# Patient Record
Sex: FEMALE | Race: OTHER
[De-identification: names, ages, dates, MRNs, and addresses within clinical notes are randomized per-mention and may not be internally consistent; named-entity substitution may affect disease eponyms.]

---

## 2017-06-05 ENCOUNTER — HOSPITAL ENCOUNTER (OUTPATIENT)
Dept: HOSPITAL 53 - M SFHCCLAY | Age: 42
End: 2017-06-05
Attending: FAMILY MEDICINE
Payer: COMMERCIAL

## 2017-06-05 DIAGNOSIS — Z00.00: Primary | ICD-10-CM

## 2017-06-05 LAB
ALBUMIN SERPL BCG-MCNC: 3.5 GM/DL (ref 3.2–5.2)
ALBUMIN/GLOB SERPL: 1.09 {RATIO} (ref 1–1.93)
ALP SERPL-CCNC: 67 U/L (ref 45–117)
ALT SERPL W P-5'-P-CCNC: 24 U/L (ref 12–78)
ANION GAP SERPL CALC-SCNC: 8 MEQ/L (ref 8–16)
AST SERPL-CCNC: 19 U/L (ref 15–37)
BILIRUB SERPL-MCNC: 0.3 MG/DL (ref 0.2–1)
BUN SERPL-MCNC: 20 MG/DL (ref 7–18)
CALCIUM SERPL-MCNC: 8.3 MG/DL (ref 8.5–10.1)
CHLORIDE SERPL-SCNC: 106 MEQ/L (ref 98–107)
CHOLEST SERPL-MCNC: 163 MG/DL (ref ?–200)
CO2 SERPL-SCNC: 25 MEQ/L (ref 21–32)
CREAT SERPL-MCNC: 0.76 MG/DL (ref 0.55–1.02)
GFR SERPL CREATININE-BSD FRML MDRD: > 60 ML/MIN/{1.73_M2} (ref 58–?)
GLUCOSE SERPL-MCNC: 90 MG/DL (ref 70–105)
POTASSIUM SERPL-SCNC: 4.1 MEQ/L (ref 3.5–5.1)
PROT SERPL-MCNC: 6.7 GM/DL (ref 6.4–8.2)
SODIUM SERPL-SCNC: 139 MEQ/L (ref 136–145)
TRIGL SERPL-MCNC: 41 MG/DL (ref ?–150)

## 2017-10-15 ENCOUNTER — HOSPITAL ENCOUNTER (OUTPATIENT)
Dept: HOSPITAL 53 - M WUC | Age: 42
End: 2017-10-15
Attending: PHYSICIAN ASSISTANT
Payer: COMMERCIAL

## 2017-10-15 DIAGNOSIS — M23.8X2: Primary | ICD-10-CM

## 2017-10-16 NOTE — REP
Left knee series:  Five views.

 

History:  Knee injury.

 

Findings:  Five views of the left knee demonstrate normal bones, joints, and soft

tissues.  No fracture or subluxation is seen.

 

Impression:

 

Negative left knee radiographs.

 

 

Signed by

Greg Camarillo MD 10/16/2017 08:44 A

## 2017-10-21 ENCOUNTER — HOSPITAL ENCOUNTER (OUTPATIENT)
Dept: HOSPITAL 53 - M RAD | Age: 42
End: 2017-10-21
Attending: FAMILY MEDICINE
Payer: COMMERCIAL
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
## 2017-10-21 DIAGNOSIS — S89.92XD: Primary | ICD-10-CM
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
## 2017-10-23 NOTE — REP
MRI LEFT KNEE:

 

TECHNIQUE:  Axial proton density fat saturation, sagittal proton density T2 STIR,

water excitation, coronal proton density, proton density fat saturation.

 

The study is limited due to patient motion.

 

I do not see definite meniscal tear.  There is increased signal on T2-weighted

images involving the superior aspect of the anterior cruciate ligament at its

femoral insertion.  The ligament appears torn at its femoral insertion site.  The

posterior cruciate ligament is intact.  Collateral ligaments appear intact.

Extensor mechanism is intact.  No cartilaginous defect is seen.  There is

ill-defined marrow edema in the tibial plateaus posteriorly compatible with bone

bruising.  There is a moderate joint effusion.  There is mild to moderate

superficial soft tissue edema.

 

IMPRESSION:

 

The study is limited due to patient motion. I do not see a definite meniscal

tear.  There does appear to be a tear of the anterior cruciate ligament at its

femoral insertion.  There is bone bruising of the posterior aspect of the tibial

plateaus.  There is a moderate joint effusion.

 

 

Signed by

Xavi Terrazas MD 10/23/2017 09:57 A

## 2017-11-27 ENCOUNTER — HOSPITAL ENCOUNTER (OUTPATIENT)
Dept: HOSPITAL 53 - M SFHCCLAY | Age: 42
End: 2017-11-27
Attending: FAMILY MEDICINE
Payer: COMMERCIAL

## 2017-11-27 ENCOUNTER — HOSPITAL ENCOUNTER (OUTPATIENT)
Dept: HOSPITAL 53 - M PT | Age: 42
LOS: 3 days | End: 2017-11-30
Attending: ORTHOPAEDIC SURGERY
Payer: COMMERCIAL

## 2017-11-27 DIAGNOSIS — M25.569: ICD-10-CM

## 2017-11-27 DIAGNOSIS — Z01.818: Primary | ICD-10-CM

## 2017-11-27 DIAGNOSIS — Z51.89: Primary | ICD-10-CM

## 2017-11-27 LAB
ANION GAP SERPL CALC-SCNC: 8 MEQ/L (ref 8–16)
BUN SERPL-MCNC: 17 MG/DL (ref 7–18)
CALCIUM SERPL-MCNC: 9.3 MG/DL (ref 8.5–10.1)
CHLORIDE SERPL-SCNC: 106 MEQ/L (ref 98–107)
CO2 SERPL-SCNC: 25 MEQ/L (ref 21–32)
CREAT SERPL-MCNC: 0.71 MG/DL (ref 0.55–1.02)
ERYTHROCYTE [DISTWIDTH] IN BLOOD BY AUTOMATED COUNT: 13 % (ref 11.5–14.5)
GFR SERPL CREATININE-BSD FRML MDRD: > 60 ML/MIN/{1.73_M2} (ref 58–?)
GLUCOSE SERPL-MCNC: 96 MG/DL (ref 70–105)
INR PPP: 0.88
MCH RBC QN AUTO: 28.3 PG (ref 27–33)
MCHC RBC AUTO-ENTMCNC: 32.7 G/DL (ref 32–36.5)
MCV RBC AUTO: 86.3 FL (ref 80–96)
NRBC BLD AUTO-RTO: 0 % (ref 0–0)
PLATELET # BLD AUTO: 263 10^3/UL (ref 150–450)
POTASSIUM SERPL-SCNC: 4.3 MEQ/L (ref 3.5–5.1)
SODIUM SERPL-SCNC: 139 MEQ/L (ref 136–145)
WBC # BLD AUTO: 6.2 10^3/UL (ref 4–10)

## 2017-12-13 ENCOUNTER — HOSPITAL ENCOUNTER (OUTPATIENT)
Dept: HOSPITAL 53 - M PT | Age: 42
LOS: 18 days | End: 2017-12-31
Attending: ORTHOPAEDIC SURGERY
Payer: COMMERCIAL

## 2017-12-13 DIAGNOSIS — S83.282A: ICD-10-CM

## 2017-12-13 DIAGNOSIS — Z51.89: Primary | ICD-10-CM

## 2017-12-13 DIAGNOSIS — M25.569: ICD-10-CM

## 2017-12-13 PROCEDURE — 97110 THERAPEUTIC EXERCISES: CPT

## 2018-01-02 ENCOUNTER — HOSPITAL ENCOUNTER (OUTPATIENT)
Dept: HOSPITAL 53 - M PT | Age: 43
LOS: 29 days | End: 2018-01-31
Attending: ORTHOPAEDIC SURGERY
Payer: COMMERCIAL

## 2018-01-02 DIAGNOSIS — M25.569: Primary | ICD-10-CM

## 2018-01-02 PROCEDURE — 97010 HOT OR COLD PACKS THERAPY: CPT

## 2018-02-07 ENCOUNTER — HOSPITAL ENCOUNTER (OUTPATIENT)
Dept: HOSPITAL 53 - M PT | Age: 43
LOS: 21 days | End: 2018-02-28
Attending: ORTHOPAEDIC SURGERY
Payer: COMMERCIAL

## 2018-02-07 DIAGNOSIS — Z51.89: Primary | ICD-10-CM

## 2018-02-07 DIAGNOSIS — M25.569: ICD-10-CM

## 2018-02-07 PROCEDURE — 97110 THERAPEUTIC EXERCISES: CPT

## 2018-03-07 ENCOUNTER — HOSPITAL ENCOUNTER (OUTPATIENT)
Dept: HOSPITAL 53 - M PT | Age: 43
LOS: 24 days | End: 2018-03-31
Attending: ORTHOPAEDIC SURGERY
Payer: COMMERCIAL

## 2018-03-07 DIAGNOSIS — M25.569: ICD-10-CM

## 2018-03-07 DIAGNOSIS — Z51.89: Primary | ICD-10-CM

## 2018-03-07 PROCEDURE — 97110 THERAPEUTIC EXERCISES: CPT

## 2018-04-12 ENCOUNTER — HOSPITAL ENCOUNTER (OUTPATIENT)
Dept: HOSPITAL 53 - M PT | Age: 43
LOS: 18 days | End: 2018-04-30
Attending: ORTHOPAEDIC SURGERY
Payer: COMMERCIAL

## 2018-04-12 DIAGNOSIS — M25.562: Primary | ICD-10-CM

## 2018-04-12 PROCEDURE — 97110 THERAPEUTIC EXERCISES: CPT

## 2018-06-23 ENCOUNTER — HOSPITAL ENCOUNTER (OUTPATIENT)
Dept: HOSPITAL 53 - M LAB REF | Age: 43
End: 2018-06-23
Payer: COMMERCIAL

## 2018-06-23 DIAGNOSIS — J02.9: Primary | ICD-10-CM

## 2018-06-23 PROCEDURE — 87070 CULTURE OTHR SPECIMN AEROBIC: CPT

## 2018-07-11 ENCOUNTER — HOSPITAL ENCOUNTER (OUTPATIENT)
Dept: HOSPITAL 53 - M SFHCCLAY | Age: 43
End: 2018-07-11
Attending: FAMILY MEDICINE
Payer: COMMERCIAL

## 2018-07-11 DIAGNOSIS — Z00.00: Primary | ICD-10-CM

## 2018-07-11 LAB
ALBUMIN/GLOBULIN RATIO: 1.09 (ref 1–1.93)
ALBUMIN: 3.8 GM/DL (ref 3.2–5.2)
ALKALINE PHOSPHATASE: 82 U/L (ref 45–117)
ALT SERPL W P-5'-P-CCNC: 28 U/L (ref 12–78)
ANION GAP: 6 MEQ/L (ref 8–16)
AST SERPL-CCNC: 18 U/L (ref 7–37)
BILIRUBIN,TOTAL: 0.5 MG/DL (ref 0.2–1)
BLOOD UREA NITROGEN: 17 MG/DL (ref 7–18)
CALCIUM LEVEL: 8.7 MG/DL (ref 8.5–10.1)
CARBON DIOXIDE LEVEL: 27 MEQ/L (ref 21–32)
CHLORIDE LEVEL: 107 MEQ/L (ref 98–107)
CHOLEST SERPL-MCNC: 199 MG/DL (ref ?–200)
CHOLESTEROL RISK RATIO: 3.11 (ref ?–5)
CREATININE FOR GFR: 0.79 MG/DL (ref 0.55–1.3)
GFR SERPL CREATININE-BSD FRML MDRD: > 60 ML/MIN/{1.73_M2} (ref 58–?)
GLUCOSE, FASTING: 102 MG/DL (ref 70–100)
HDLC SERPL-MCNC: 64 MG/DL (ref 40–?)
LDL CHOLESTEROL: 121.6 MG/DL (ref ?–100)
NONHDLC SERPL-MCNC: 135 MG/DL
POTASSIUM SERUM: 4.6 MEQ/L (ref 3.5–5.1)
SODIUM LEVEL: 140 MEQ/L (ref 136–145)
THYROID STIMULATING HORMONE: 1.77 UIU/ML (ref 0.36–3.74)
TOTAL PROTEIN: 7.3 GM/DL (ref 6.4–8.2)
TRIGLYCERIDES LEVEL: 67 MG/DL (ref ?–150)

## 2018-07-11 PROCEDURE — 84443 ASSAY THYROID STIM HORMONE: CPT

## 2018-10-27 ENCOUNTER — HOSPITAL ENCOUNTER (OUTPATIENT)
Dept: HOSPITAL 53 - M WUC | Age: 43
End: 2018-10-27
Attending: PHYSICIAN ASSISTANT
Payer: COMMERCIAL

## 2018-10-27 DIAGNOSIS — M79.644: Primary | ICD-10-CM

## 2018-10-27 LAB — URIC ACID: 2.6 MG/DL (ref 2.6–6)

## 2018-10-27 PROCEDURE — 84550 ASSAY OF BLOOD/URIC ACID: CPT

## 2018-10-28 ENCOUNTER — HOSPITAL ENCOUNTER (EMERGENCY)
Dept: HOSPITAL 53 - M ED | Age: 43
Discharge: HOME | End: 2018-10-28
Payer: COMMERCIAL

## 2018-10-28 DIAGNOSIS — L03.113: Primary | ICD-10-CM

## 2018-10-28 PROCEDURE — 73130 X-RAY EXAM OF HAND: CPT

## 2018-10-28 RX ADMIN — HYDROCODONE BITARTRATE AND ACETAMINOPHEN 1 TAB: 5; 325 TABLET ORAL at 20:29

## 2018-10-28 RX ADMIN — CLINDAMYCIN HYDROCHLORIDE 1 MG: 150 CAPSULE ORAL at 20:28

## 2018-10-29 ENCOUNTER — HOSPITAL ENCOUNTER (OUTPATIENT)
Dept: HOSPITAL 53 - M SFHCCLAY | Age: 43
End: 2018-10-29
Attending: FAMILY MEDICINE
Payer: COMMERCIAL

## 2018-10-29 DIAGNOSIS — Z12.4: Primary | ICD-10-CM

## 2018-10-31 LAB — HPV LOW VOL RFLX: (no result)

## 2020-04-17 ENCOUNTER — HOSPITAL ENCOUNTER (OUTPATIENT)
Dept: HOSPITAL 53 - M WUC | Age: 45
End: 2020-04-17
Attending: NURSE PRACTITIONER
Payer: COMMERCIAL

## 2020-04-17 DIAGNOSIS — M25.521: Primary | ICD-10-CM

## 2020-04-17 NOTE — REP
HISTORY: Elbow pain. No trauma.

 

COMPARISON: No priors.

 

FINDINGS:

 

There is no acute fracture, dislocation, subluxation, or joint effusion.

 

 

 

 

Electronically Signed by

Luke Smith DO 04/17/2020 07:59 P

## 2021-02-03 ENCOUNTER — HOSPITAL ENCOUNTER (OUTPATIENT)
Dept: HOSPITAL 53 - M PLALAB | Age: 46
End: 2021-02-03
Attending: ADVANCED PRACTICE MIDWIFE
Payer: COMMERCIAL

## 2021-02-03 DIAGNOSIS — R10.2: Primary | ICD-10-CM

## 2021-02-03 LAB
B-HCG SERPL QL: NEGATIVE
CHLAMYDIA DNA AMPLIFICATION: NEGATIVE
FSH SERPL-ACNC: 4.3 MIU/ML
LH SERPL-ACNC: 4.6 MIU/ML
N GONORRHOEA RRNA SPEC QL NAA+PROBE: NEGATIVE
T4 FREE SERPL-MCNC: 0.86 NG/DL (ref 0.76–1.46)
TSH SERPL DL<=0.005 MIU/L-ACNC: 1.78 UIU/ML (ref 0.36–3.74)

## 2021-02-05 ENCOUNTER — HOSPITAL ENCOUNTER (OUTPATIENT)
Dept: HOSPITAL 53 - M WHC | Age: 46
End: 2021-02-05
Attending: ADVANCED PRACTICE MIDWIFE
Payer: COMMERCIAL

## 2021-02-05 DIAGNOSIS — N88.8: Primary | ICD-10-CM

## 2021-02-05 DIAGNOSIS — R10.2: ICD-10-CM

## 2021-02-05 NOTE — REP
INDICATION:

R10.2 PELVIC PAIN,MENORRHAGIA



COMPARISON:

None.



TECHNIQUE:

Transabdominal pelvic ultrasound followed by transvaginal examination for better

evaluation of the endometrium and adnexa with color evaluation of the ovaries.



FINDINGS:

Bladder is unremarkable and measures .



Normal anteverted uterus measures 8.9 x 5.1 x 5.5 cm.  The endometrial complex

measures 15.5 mm thickness without discrete abnormality. Nabothian cysts identified

measuring up to 15 mm.



Bilateral ovaries are normal in appearance and vascularity without evidence for

torsion.  Right ovary measures 4.0 x 1.7 x 1.5 cm; left ovary measures 3.6 x 2.1 x 2.6

cm.



IMPRESSION:

Prominent nabothian cysts.

No further pelvic abnormality appreciated.

Thickened endometrial complex likely related to menstrual cycle.





<Electronically signed by Kyree Ramos > 02/05/21 [id number]

## 2021-04-05 ENCOUNTER — HOSPITAL ENCOUNTER (OUTPATIENT)
Dept: HOSPITAL 53 - M SFHCCLAY | Age: 46
End: 2021-04-05
Attending: FAMILY MEDICINE
Payer: COMMERCIAL

## 2021-04-05 DIAGNOSIS — M79.661: ICD-10-CM

## 2021-04-05 DIAGNOSIS — M79.662: Primary | ICD-10-CM

## 2021-04-05 LAB
BUN SERPL-MCNC: 16 MG/DL (ref 7–18)
CALCIUM SERPL-MCNC: 9.1 MG/DL (ref 8.5–10.1)
CHLORIDE SERPL-SCNC: 107 MEQ/L (ref 98–107)
CO2 SERPL-SCNC: 24 MEQ/L (ref 21–32)
CREAT SERPL-MCNC: 0.73 MG/DL (ref 0.55–1.3)
GFR SERPL CREATININE-BSD FRML MDRD: > 60 ML/MIN/{1.73_M2} (ref 58–?)
GLUCOSE SERPL-MCNC: 114 MG/DL (ref 70–100)
HCT VFR BLD AUTO: 40.8 % (ref 36–47)
HGB BLD-MCNC: 13.1 G/DL (ref 12–15.5)
MAGNESIUM SERPL-MCNC: 2.1 MG/DL (ref 1.8–2.4)
MCH RBC QN AUTO: 28.2 PG (ref 27–33)
MCHC RBC AUTO-ENTMCNC: 32.1 G/DL (ref 32–36.5)
MCV RBC AUTO: 87.7 FL (ref 80–96)
PLATELET # BLD AUTO: 289 10^3/UL (ref 150–450)
POTASSIUM SERPL-SCNC: 4.4 MEQ/L (ref 3.5–5.1)
RBC # BLD AUTO: 4.65 10^6/UL (ref 4–5.4)
SODIUM SERPL-SCNC: 140 MEQ/L (ref 136–145)
WBC # BLD AUTO: 7 10^3/UL (ref 4–10)

## 2021-05-03 ENCOUNTER — HOSPITAL ENCOUNTER (OUTPATIENT)
Dept: HOSPITAL 53 - M SFHCCLAY | Age: 46
End: 2021-05-03
Attending: FAMILY MEDICINE
Payer: COMMERCIAL

## 2021-05-03 DIAGNOSIS — R73.01: Primary | ICD-10-CM

## 2021-05-03 LAB — EST. AVERAGE GLUCOSE BLD GHB EST-MCNC: 108 MG/DL (ref 60–110)

## 2021-05-13 ENCOUNTER — HOSPITAL ENCOUNTER (OUTPATIENT)
Dept: HOSPITAL 53 - M RAD | Age: 46
End: 2021-05-13
Attending: FAMILY MEDICINE
Payer: COMMERCIAL

## 2021-05-13 DIAGNOSIS — I70.293: Primary | ICD-10-CM

## 2021-05-16 NOTE — REP
INDICATION:

PATRICIA LOWER LEG PAIN



COMPARISON:

None.



TECHNIQUE:

Real time gray scale and color Doppler evaluation of the bilateral lower extremity

arterial vasculature using linear high frequency transducer.



FINDINGS:

Gray scale and color images demonstrate minimal amounts of atheromatous plaquing with

no focal stenosis or occlusion identified.  Doppler interrogation demonstrates normal

triphasic arterial wave forms and velocities bilaterally.



Right WALLY: 1.1

Left WALLY: 1.1



Peak systolic velocities (cm/sec)



Common femoral artery:  Right 158.2; Left 130.6

Profunda femoris:  Right 73.0; Left 45.5

SFA (proximal):  Right 91.6; Left 86.4

SFA (mid):  Right 101.1; Left 86.7

SFA (distal):  Right 62.9; Left 79.4

Popliteal artery:  Right 51.6; Left this 53.6

ESTELLA (prox.):  Right 79.1; Left 58.2

Tibioperoneal trunk:  Right 51.0; Left 89.1

PTA (prox.):  Right 55.3; Left 59.7

PTA (distal):  Right 67.4; Left 57.7

ESTELLA (distal):  Right 84.8; Left 81.6



IMPRESSION:

Minimal bilateral atheromatous plaquing with no focal occlusion or stenosis.





<Electronically signed by Kyree Ramos > 05/16/21 [id number]

## 2021-09-13 ENCOUNTER — HOSPITAL ENCOUNTER (OUTPATIENT)
Dept: HOSPITAL 53 - M SFHCCLAY | Age: 46
End: 2021-09-13
Attending: FAMILY MEDICINE
Payer: COMMERCIAL

## 2021-09-13 DIAGNOSIS — E66.3: Primary | ICD-10-CM

## 2021-09-13 DIAGNOSIS — Z13.220: ICD-10-CM

## 2021-09-13 LAB
CHOLEST SERPL-MCNC: 211 MG/DL (ref ?–200)
CHOLEST/HDLC SERPL: 3.64 {RATIO} (ref ?–5)
HDLC SERPL-MCNC: 58 MG/DL (ref 40–?)
LDLC SERPL CALC-MCNC: 139 MG/DL (ref ?–100)
NONHDLC SERPL-MCNC: 153 MG/DL
TRIGL SERPL-MCNC: 68 MG/DL (ref ?–150)

## 2022-11-02 ENCOUNTER — HOSPITAL ENCOUNTER (OUTPATIENT)
Dept: HOSPITAL 53 - M SFHCCLAY | Age: 47
End: 2022-11-02
Payer: COMMERCIAL

## 2022-11-02 ENCOUNTER — HOSPITAL ENCOUNTER (OUTPATIENT)
Dept: HOSPITAL 53 - M WHC | Age: 47
End: 2022-11-02
Payer: COMMERCIAL

## 2022-11-02 DIAGNOSIS — Z13.220: ICD-10-CM

## 2022-11-02 DIAGNOSIS — Z80.7: ICD-10-CM

## 2022-11-02 DIAGNOSIS — R73.01: Primary | ICD-10-CM

## 2022-11-02 DIAGNOSIS — N88.8: ICD-10-CM

## 2022-11-02 DIAGNOSIS — N92.0: ICD-10-CM

## 2022-11-02 DIAGNOSIS — N92.0: Primary | ICD-10-CM

## 2022-11-02 LAB
ALBUMIN SERPL BCG-MCNC: 3.5 GM/DL (ref 3.2–5.2)
ALT SERPL W P-5'-P-CCNC: 25 U/L (ref 12–78)
BILIRUB SERPL-MCNC: 0.5 MG/DL (ref 0.2–1)
BUN SERPL-MCNC: 18 MG/DL (ref 7–18)
CALCIUM SERPL-MCNC: 8.6 MG/DL (ref 8.5–10.1)
CHLORIDE SERPL-SCNC: 105 MEQ/L (ref 98–107)
CHOLEST SERPL-MCNC: 201 MG/DL (ref ?–200)
CHOLEST/HDLC SERPL: 2.96 {RATIO} (ref ?–5)
CO2 SERPL-SCNC: 25 MEQ/L (ref 21–32)
CREAT SERPL-MCNC: 0.82 MG/DL (ref 0.55–1.3)
EST. AVERAGE GLUCOSE BLD GHB EST-MCNC: 117 MG/DL (ref 60–110)
GFR SERPL CREATININE-BSD FRML MDRD: > 60 ML/MIN/{1.73_M2} (ref 58–?)
GLUCOSE SERPL-MCNC: 114 MG/DL (ref 70–100)
HCT VFR BLD AUTO: 38 % (ref 36–47)
HDLC SERPL-MCNC: 68 MG/DL (ref 40–?)
HGB BLD-MCNC: 12.4 G/DL (ref 12–15.5)
LDLC SERPL CALC-MCNC: 122 MG/DL (ref ?–100)
MCH RBC QN AUTO: 28.1 PG (ref 27–33)
MCHC RBC AUTO-ENTMCNC: 32.6 G/DL (ref 32–36.5)
MCV RBC AUTO: 86 FL (ref 80–96)
NONHDLC SERPL-MCNC: 133 MG/DL
PLATELET # BLD AUTO: 299 10^3/UL (ref 150–450)
POTASSIUM SERPL-SCNC: 4.1 MEQ/L (ref 3.5–5.1)
PROT SERPL-MCNC: 6.9 GM/DL (ref 6.4–8.2)
RBC # BLD AUTO: 4.42 10^6/UL (ref 4–5.4)
SODIUM SERPL-SCNC: 136 MEQ/L (ref 136–145)
TRIGL SERPL-MCNC: 54 MG/DL (ref ?–150)
WBC # BLD AUTO: 6 10^3/UL (ref 4–10)

## 2022-12-18 ENCOUNTER — HOSPITAL ENCOUNTER (OUTPATIENT)
Dept: HOSPITAL 53 - M WUC | Age: 47
End: 2022-12-18
Attending: PHYSICIAN ASSISTANT
Payer: COMMERCIAL

## 2022-12-18 DIAGNOSIS — R30.0: Primary | ICD-10-CM

## 2023-01-10 ENCOUNTER — HOSPITAL ENCOUNTER (OUTPATIENT)
Dept: HOSPITAL 53 - M PLALAB | Age: 48
End: 2023-01-10
Attending: OBSTETRICS & GYNECOLOGY
Payer: COMMERCIAL

## 2023-01-10 DIAGNOSIS — N83.291: Primary | ICD-10-CM

## 2023-02-01 ENCOUNTER — HOSPITAL ENCOUNTER (OUTPATIENT)
Dept: HOSPITAL 53 - M WHC | Age: 48
End: 2023-02-01
Attending: OBSTETRICS & GYNECOLOGY
Payer: COMMERCIAL

## 2023-02-01 DIAGNOSIS — N88.8: Primary | ICD-10-CM

## 2023-06-23 ENCOUNTER — HOSPITAL ENCOUNTER (OUTPATIENT)
Dept: HOSPITAL 53 - M SFHCCLAY | Age: 48
End: 2023-06-23
Payer: COMMERCIAL

## 2023-06-23 DIAGNOSIS — R73.03: Primary | ICD-10-CM

## 2023-06-23 LAB
BUN SERPL-MCNC: 21 MG/DL (ref 9–23)
CALCIUM SERPL-MCNC: 8.4 MG/DL (ref 8.5–10.1)
CHLORIDE SERPL-SCNC: 105 MMOL/L (ref 98–107)
CO2 SERPL-SCNC: 27 MMOL/L (ref 20–31)
CREAT SERPL-MCNC: 0.79 MG/DL (ref 0.55–1.3)
EST. AVERAGE GLUCOSE BLD GHB EST-MCNC: 114 MG/DL (ref 60–110)
GFR SERPL CREATININE-BSD FRML MDRD: > 60 ML/MIN/{1.73_M2} (ref 58–?)
GLUCOSE SERPL-MCNC: 89 MG/DL (ref 60–100)
POTASSIUM SERPL-SCNC: 4.3 MMOL/L (ref 3.5–5.1)
SODIUM SERPL-SCNC: 137 MMOL/L (ref 136–145)

## 2023-11-29 ENCOUNTER — HOSPITAL ENCOUNTER (OUTPATIENT)
Dept: HOSPITAL 53 - M SFHCCLAY | Age: 48
End: 2023-11-29
Payer: COMMERCIAL

## 2023-11-29 DIAGNOSIS — R73.03: Primary | ICD-10-CM

## 2023-11-29 DIAGNOSIS — R68.82: ICD-10-CM

## 2023-11-29 LAB
ALBUMIN SERPL BCG-MCNC: 3.4 G/DL (ref 3.2–5.2)
ALP SERPL-CCNC: 81 U/L (ref 46–116)
ALT SERPL W P-5'-P-CCNC: 27 U/L (ref 7–40)
AST SERPL-CCNC: 19 U/L (ref ?–34)
BILIRUB SERPL-MCNC: 0.3 MG/DL (ref 0.3–1.2)
BUN SERPL-MCNC: 22 MG/DL (ref 9–23)
CALCIUM SERPL-MCNC: 8.7 MG/DL (ref 8.5–10.1)
CHLORIDE SERPL-SCNC: 104 MMOL/L (ref 98–107)
CHOLEST SERPL-MCNC: 212 MG/DL (ref ?–200)
CHOLEST/HDLC SERPL: 3.91 {RATIO} (ref ?–5)
CO2 SERPL-SCNC: 25 MMOL/L (ref 20–31)
CREAT SERPL-MCNC: 0.76 MG/DL (ref 0.55–1.3)
GFR SERPL CREATININE-BSD FRML MDRD: > 60 ML/MIN/{1.73_M2} (ref 58–?)
GLUCOSE SERPL-MCNC: 116 MG/DL (ref 60–100)
HDLC SERPL-MCNC: 54.1 MG/DL (ref 40–?)
LDLC SERPL CALC-MCNC: 126.3 MG/DL (ref ?–100)
NONHDLC SERPL-MCNC: 157.9 MG/DL
POTASSIUM SERPL-SCNC: 4.5 MMOL/L (ref 3.5–5.1)
PROT SERPL-MCNC: 6.6 G/DL (ref 5.7–8.2)
SODIUM SERPL-SCNC: 137 MMOL/L (ref 136–145)
T4 FREE SERPL-MCNC: 0.96 NG/DL (ref 0.89–1.76)
TESTOST SERPL-MCNC: 36 NG/DL (ref 14–76)
TRIGL SERPL-MCNC: 158 MG/DL (ref ?–150)
TSH SERPL DL<=0.005 MIU/L-ACNC: 2.09 UIU/ML (ref 0.55–4.78)

## 2024-01-05 ENCOUNTER — HOSPITAL ENCOUNTER (OUTPATIENT)
Dept: HOSPITAL 53 - M WUC | Age: 49
End: 2024-01-05
Attending: NURSE PRACTITIONER
Payer: COMMERCIAL

## 2024-01-05 DIAGNOSIS — M54.2: Primary | ICD-10-CM

## 2024-04-12 ENCOUNTER — HOSPITAL ENCOUNTER (OUTPATIENT)
Dept: HOSPITAL 53 - M SDC | Age: 49
Discharge: HOME | End: 2024-04-12
Attending: ORTHOPAEDIC SURGERY
Payer: COMMERCIAL

## 2024-04-12 VITALS — DIASTOLIC BLOOD PRESSURE: 58 MMHG | OXYGEN SATURATION: 97 % | SYSTOLIC BLOOD PRESSURE: 103 MMHG | TEMPERATURE: 96.9 F

## 2024-04-12 VITALS — WEIGHT: 145.4 LBS | HEIGHT: 59 IN | BODY MASS INDEX: 29.31 KG/M2

## 2024-04-12 DIAGNOSIS — G56.01: Primary | ICD-10-CM

## 2024-04-12 PROCEDURE — 29848 WRIST ENDOSCOPY/SURGERY: CPT

## 2024-04-12 PROCEDURE — 81025 URINE PREGNANCY TEST: CPT

## 2024-08-07 ENCOUNTER — HOSPITAL ENCOUNTER (OUTPATIENT)
Dept: HOSPITAL 53 - M LAB REF | Age: 49
End: 2024-08-07
Attending: PHYSICIAN ASSISTANT
Payer: COMMERCIAL

## 2024-08-07 DIAGNOSIS — R30.0: Primary | ICD-10-CM

## 2024-09-24 ENCOUNTER — HOSPITAL ENCOUNTER (OUTPATIENT)
Dept: HOSPITAL 53 - M SFHCCLAY | Age: 49
End: 2024-09-24
Payer: COMMERCIAL

## 2024-09-24 DIAGNOSIS — E78.5: ICD-10-CM

## 2024-09-24 DIAGNOSIS — R73.01: Primary | ICD-10-CM

## 2024-10-07 ENCOUNTER — HOSPITAL ENCOUNTER (OUTPATIENT)
Dept: HOSPITAL 53 - M SFHCCLAY | Age: 49
End: 2024-10-07
Payer: COMMERCIAL

## 2024-10-07 DIAGNOSIS — R53.83: ICD-10-CM

## 2024-10-07 DIAGNOSIS — F32.A: ICD-10-CM

## 2024-10-07 DIAGNOSIS — N92.0: ICD-10-CM

## 2024-10-07 DIAGNOSIS — M25.532: ICD-10-CM

## 2024-10-07 DIAGNOSIS — E78.5: ICD-10-CM

## 2024-10-07 DIAGNOSIS — R73.01: Primary | ICD-10-CM

## 2024-10-07 LAB
ALBUMIN SERPL BCG-MCNC: 3.6 G/DL (ref 3.2–5.2)
ALP SERPL-CCNC: 86 U/L (ref 46–116)
ALT SERPL W P-5'-P-CCNC: 19 U/L (ref 7–40)
AST SERPL-CCNC: 12 U/L (ref ?–34)
BASOPHILS # BLD AUTO: 0 10^3/UL (ref 0–0.2)
BASOPHILS NFR BLD AUTO: 0.6 % (ref 0–1)
BILIRUB SERPL-MCNC: 0.3 MG/DL (ref 0.3–1.2)
BUN SERPL-MCNC: 20 MG/DL (ref 9–23)
CALCIUM SERPL-MCNC: 9.3 MG/DL (ref 8.5–10.1)
CHLORIDE SERPL-SCNC: 107 MMOL/L (ref 98–107)
CHOLEST SERPL-MCNC: 201 MG/DL (ref ?–200)
CHOLEST/HDLC SERPL: 3.75 {RATIO} (ref ?–5)
CO2 SERPL-SCNC: 25 MMOL/L (ref 20–31)
CREAT SERPL-MCNC: 0.78 MG/DL (ref 0.55–1.3)
EOSINOPHIL # BLD AUTO: 0.1 10^3/UL (ref 0–0.5)
EOSINOPHIL NFR BLD AUTO: 1.9 % (ref 0–3)
EST. AVERAGE GLUCOSE BLD GHB EST-MCNC: 111 MG/DL (ref 60–110)
FERRITIN SERPL-MCNC: 20.7 NG/ML (ref 7.3–270.7)
FOLATE SERPL-MCNC: 16.69 NG/ML (ref 5.4–?)
GFR SERPL CREATININE-BSD FRML MDRD: > 60 ML/MIN/{1.73_M2} (ref 58–?)
GLUCOSE SERPL-MCNC: 108 MG/DL (ref 60–100)
HCT VFR BLD AUTO: 39.7 % (ref 36–47)
HDLC SERPL-MCNC: 53.6 MG/DL (ref 40–?)
HGB BLD-MCNC: 13 G/DL (ref 12–15.5)
IRON SATN MFR SERPL: 15.4 % (ref 13.2–45)
IRON SERPL-MCNC: 58 UG/DL (ref 50–170)
LDLC SERPL CALC-MCNC: 128.6 MG/DL (ref ?–100)
LYMPHOCYTES # BLD AUTO: 2.5 10^3/UL (ref 1.5–5)
LYMPHOCYTES NFR BLD AUTO: 37.8 % (ref 24–44)
MCH RBC QN AUTO: 28 PG (ref 27–33)
MCHC RBC AUTO-ENTMCNC: 32.7 G/DL (ref 32–36.5)
MCV RBC AUTO: 85.6 FL (ref 80–96)
MONOCYTES # BLD AUTO: 0.6 10^3/UL (ref 0–0.8)
MONOCYTES NFR BLD AUTO: 9.1 % (ref 2–8)
NEUTROPHILS # BLD AUTO: 3.4 10^3/UL (ref 1.5–8.5)
NEUTROPHILS NFR BLD AUTO: 50.3 % (ref 36–66)
NONHDLC SERPL-MCNC: 147.4 MG/DL
PLATELET # BLD AUTO: 250 10^3/UL (ref 150–450)
POTASSIUM SERPL-SCNC: 4.4 MMOL/L (ref 3.5–5.1)
PROT SERPL-MCNC: 7 G/DL (ref 5.7–8.2)
RBC # BLD AUTO: 4.64 10^6/UL (ref 4–5.4)
SODIUM SERPL-SCNC: 136 MMOL/L (ref 136–145)
TIBC SERPL-MCNC: 377 UG/DL (ref 250–425)
TRIGL SERPL-MCNC: 94 MG/DL (ref ?–150)
VIT B12 SERPL-MCNC: 1318 PG/ML (ref 211–911)
WBC # BLD AUTO: 6.7 10^3/UL (ref 4–10)

## 2024-10-08 ENCOUNTER — HOSPITAL ENCOUNTER (OUTPATIENT)
Dept: HOSPITAL 53 - M WUC | Age: 49
End: 2024-10-08
Payer: COMMERCIAL

## 2024-10-08 DIAGNOSIS — M25.532: Primary | ICD-10-CM

## 2025-01-19 ENCOUNTER — HOSPITAL ENCOUNTER (OUTPATIENT)
Dept: HOSPITAL 53 - M RAD | Age: 50
End: 2025-01-19
Attending: REGISTERED NURSE
Payer: COMMERCIAL

## 2025-01-19 DIAGNOSIS — S63.602A: Primary | ICD-10-CM

## 2025-02-04 ENCOUNTER — HOSPITAL ENCOUNTER (OUTPATIENT)
Dept: HOSPITAL 53 - M SFHCCLAY | Age: 50
End: 2025-02-04
Payer: COMMERCIAL

## 2025-02-04 DIAGNOSIS — R32: Primary | ICD-10-CM

## 2025-04-10 ENCOUNTER — HOSPITAL ENCOUNTER (OUTPATIENT)
Dept: HOSPITAL 53 - M PT | Age: 50
LOS: 20 days | End: 2025-04-30
Payer: COMMERCIAL

## 2025-04-10 DIAGNOSIS — R32: Primary | ICD-10-CM
